# Patient Record
Sex: FEMALE | Race: WHITE | NOT HISPANIC OR LATINO | Employment: FULL TIME | URBAN - METROPOLITAN AREA
[De-identification: names, ages, dates, MRNs, and addresses within clinical notes are randomized per-mention and may not be internally consistent; named-entity substitution may affect disease eponyms.]

---

## 2023-10-07 PROBLEM — M99.07 SOMATIC DYSFUNCTION OF UPPER EXTREMITIES: Status: ACTIVE | Noted: 2023-10-07

## 2023-10-07 PROBLEM — E55.9 VITAMIN D DEFICIENCY: Status: ACTIVE | Noted: 2023-10-07

## 2023-10-07 PROBLEM — G03.0 ASEPTIC MENINGITIS (HHS-HCC): Status: ACTIVE | Noted: 2023-10-07

## 2023-10-07 PROBLEM — Z87.42: Status: ACTIVE | Noted: 2023-10-07

## 2023-10-07 PROBLEM — G95.0 SYRINGOMYELIA (MULTI): Status: ACTIVE | Noted: 2023-10-07

## 2023-10-07 PROBLEM — M99.02 SOMATIC DYSFUNCTION OF THORACIC REGION: Status: ACTIVE | Noted: 2023-10-07

## 2023-10-07 PROBLEM — M99.03 SOMATIC DYSFUNCTION OF LUMBAR REGION: Status: ACTIVE | Noted: 2023-10-07

## 2023-10-07 PROBLEM — G43.909 MIGRAINE: Status: ACTIVE | Noted: 2023-10-07

## 2023-10-07 PROBLEM — M99.06 SOMATIC DYSFUNCTION OF LOWER EXTREMITY: Status: ACTIVE | Noted: 2023-10-07

## 2023-10-07 PROBLEM — G93.5 CHIARI I MALFORMATION (MULTI): Status: ACTIVE | Noted: 2023-10-07

## 2023-10-07 PROBLEM — G03.1: Status: ACTIVE | Noted: 2023-10-07

## 2023-10-07 PROBLEM — G93.2 IIH (IDIOPATHIC INTRACRANIAL HYPERTENSION): Status: ACTIVE | Noted: 2023-10-07

## 2023-10-07 PROBLEM — M62.838 MUSCLE SPASM: Status: ACTIVE | Noted: 2023-10-07

## 2023-10-07 PROBLEM — M99.04 SOMATIC DYSFUNCTION OF SACRAL REGION: Status: ACTIVE | Noted: 2023-10-07

## 2023-10-07 PROBLEM — G44.52 NEW DAILY PERSISTENT HEADACHE: Status: ACTIVE | Noted: 2023-10-07

## 2023-10-07 PROBLEM — M54.81 BILATERAL OCCIPITAL NEURALGIA: Status: ACTIVE | Noted: 2023-10-07

## 2023-10-07 PROBLEM — G91.0 COMMUNICATING HYDROCEPHALUS (MULTI): Status: ACTIVE | Noted: 2023-10-07

## 2023-10-07 PROBLEM — L40.9 PSORIASIS: Status: ACTIVE | Noted: 2023-10-07

## 2023-10-07 PROBLEM — M99.08 RIB CAGE REGION SOMATIC DYSFUNCTION: Status: ACTIVE | Noted: 2023-10-07

## 2023-10-07 PROBLEM — G96.198 PSEUDOMENINGOCELE: Status: ACTIVE | Noted: 2023-10-07

## 2023-10-07 PROBLEM — L30.1 DYSHIDROSIS (POMPHOLYX): Status: ACTIVE | Noted: 2021-08-06

## 2023-10-07 PROBLEM — Z86.2 H/O LYMPHOPENIA: Status: ACTIVE | Noted: 2023-10-07

## 2023-10-07 PROBLEM — R01.1 MURMUR: Status: RESOLVED | Noted: 2023-10-07 | Resolved: 2023-10-07

## 2023-10-07 PROBLEM — R51.0 HEADACHE DUE TO LOW CEREBROSPINAL FLUID PRESSURE: Status: ACTIVE | Noted: 2023-10-07

## 2023-10-07 PROBLEM — M54.50 CHRONIC LOW BACK PAIN: Status: ACTIVE | Noted: 2023-10-07

## 2023-10-07 PROBLEM — M54.2 NECK PAIN: Status: ACTIVE | Noted: 2023-10-07

## 2023-10-07 PROBLEM — G89.29 CHRONIC LOW BACK PAIN: Status: ACTIVE | Noted: 2023-10-07

## 2023-10-07 PROBLEM — D84.9 IMMUNE DEFICIENCY DISORDER (MULTI): Status: ACTIVE | Noted: 2023-10-07

## 2023-10-07 PROBLEM — Z79.899 HIGH RISK MEDICATION USE: Status: ACTIVE | Noted: 2023-10-07

## 2023-10-07 PROBLEM — F98.8 ADD (ATTENTION DEFICIT DISORDER): Status: ACTIVE | Noted: 2023-10-07

## 2023-10-07 PROBLEM — G43.019 INTRACTABLE MIGRAINE WITHOUT AURA AND WITHOUT STATUS MIGRAINOSUS: Status: ACTIVE | Noted: 2023-10-07

## 2023-10-07 RX ORDER — FLUDROCORTISONE ACETATE 0.1 MG/1
1 TABLET ORAL DAILY
COMMUNITY
Start: 2021-10-05

## 2023-10-07 RX ORDER — ACETAMINOPHEN 325 MG/1
1-2 TABLET ORAL EVERY 6 HOURS PRN
COMMUNITY
Start: 2020-02-22

## 2023-10-07 RX ORDER — ERGOCALCIFEROL (VITAMIN D2) 50 MCG
50 CAPSULE ORAL DAILY
COMMUNITY

## 2023-10-07 RX ORDER — CLOBETASOL PROPIONATE 0.5 MG/G
1 OINTMENT TOPICAL
COMMUNITY
Start: 2021-08-06

## 2023-10-07 RX ORDER — ONDANSETRON 4 MG/1
4 TABLET, FILM COATED ORAL EVERY 8 HOURS PRN
COMMUNITY
Start: 2021-01-18

## 2023-10-07 RX ORDER — IBUPROFEN 600 MG/1
1 TABLET ORAL 3 TIMES DAILY PRN
COMMUNITY
Start: 2020-04-10

## 2023-10-07 RX ORDER — TIZANIDINE 4 MG/1
2-3 TABLET ORAL NIGHTLY PRN
COMMUNITY
Start: 2021-10-14 | End: 2023-10-09 | Stop reason: SDUPTHER

## 2023-10-09 ENCOUNTER — TELEMEDICINE (OUTPATIENT)
Dept: NEUROLOGY | Facility: CLINIC | Age: 33
End: 2023-10-09
Payer: COMMERCIAL

## 2023-10-09 DIAGNOSIS — R51.0 HEADACHE DUE TO LOW CEREBROSPINAL FLUID PRESSURE: ICD-10-CM

## 2023-10-09 DIAGNOSIS — G93.2 IIH (IDIOPATHIC INTRACRANIAL HYPERTENSION): Primary | ICD-10-CM

## 2023-10-09 DIAGNOSIS — M54.2 NECK PAIN: ICD-10-CM

## 2023-10-09 PROCEDURE — 99214 OFFICE O/P EST MOD 30 MIN: CPT | Performed by: NURSE PRACTITIONER

## 2023-10-09 RX ORDER — TIZANIDINE 4 MG/1
8-12 TABLET ORAL NIGHTLY PRN
Qty: 180 TABLET | Refills: 1 | Status: SHIPPED | OUTPATIENT
Start: 2023-10-09

## 2023-10-09 NOTE — PROGRESS NOTES
Patient being assessed today for follow-up of IIH and daily persistent headache.  She reports that she had surgery back in June and despite this she is still having the persistent daily headaches that never stop.  She does have a follow-up appointment with the neurosurgeon next week.  We will continue the tizanidine as she has been taking it.  Follow-up will be pending what is determined by neurosurgery.    This note was created with voice recognition software and was not corrected for typographical or grammatical errors